# Patient Record
Sex: FEMALE | Race: BLACK OR AFRICAN AMERICAN | NOT HISPANIC OR LATINO | URBAN - METROPOLITAN AREA
[De-identification: names, ages, dates, MRNs, and addresses within clinical notes are randomized per-mention and may not be internally consistent; named-entity substitution may affect disease eponyms.]

---

## 2021-12-14 ENCOUNTER — OFFICE VISIT (OUTPATIENT)
Dept: URBAN - METROPOLITAN AREA CLINIC 40 | Facility: CLINIC | Age: 36
End: 2021-12-14
Payer: COMMERCIAL

## 2021-12-14 ENCOUNTER — DASHBOARD ENCOUNTERS (OUTPATIENT)
Age: 36
End: 2021-12-14

## 2021-12-14 VITALS
TEMPERATURE: 98.8 F | DIASTOLIC BLOOD PRESSURE: 72 MMHG | SYSTOLIC BLOOD PRESSURE: 100 MMHG | HEART RATE: 78 BPM | WEIGHT: 151 LBS

## 2021-12-14 DIAGNOSIS — K59.04 CHRONIC IDIOPATHIC CONSTIPATION: ICD-10-CM

## 2021-12-14 PROBLEM — 82934008: Status: ACTIVE | Noted: 2021-12-14

## 2021-12-14 PROCEDURE — 99203 OFFICE O/P NEW LOW 30 MIN: CPT | Performed by: STUDENT IN AN ORGANIZED HEALTH CARE EDUCATION/TRAINING PROGRAM

## 2021-12-14 RX ORDER — LINACLOTIDE 145 UG/1
1 CAPSULE AT LEAST 30 MINUTES BEFORE THE FIRST MEAL OF THE DAY ON AN EMPTY STOMACH CAPSULE, GELATIN COATED ORAL ONCE A DAY
Qty: 90 | Refills: 3 | OUTPATIENT
Start: 2021-12-14 | End: 2022-12-09

## 2021-12-14 NOTE — HPI-TODAY'S VISIT:
36 year-old female New to me Comes in requesting prescription for linzess. Patient stated that she was taking it previously for management of her constipation.  That did help her greatly and she had become regular in her bowel movements.  She recently changed her location and now she has no one to prescribe her Linzess.  Patient has been now suffering from constipation with intermittent lower abdomen pain which correlates with lack of bowel movements.  Also reports worsening bloating.  No nausea or vomiting.  No blood in the stool.  Overall good appetite and denies any unintentional weight loss.  No reflux or dysphagia.  No upper abdomen pain. No family history of GI malignancy. No anticoagulation. No antimotility medications No prior colonoscopy. No prior abdomen imaging. Prior blood work at previous physician office which was within normal limit as per patient.  Not available to review.